# Patient Record
Sex: FEMALE | Race: BLACK OR AFRICAN AMERICAN | NOT HISPANIC OR LATINO | URBAN - NONMETROPOLITAN AREA
[De-identification: names, ages, dates, MRNs, and addresses within clinical notes are randomized per-mention and may not be internally consistent; named-entity substitution may affect disease eponyms.]

---

## 2018-01-16 PROBLEM — H52.4: Noted: 2018-01-16

## 2018-01-16 PROBLEM — H52.13: Noted: 2018-01-16

## 2020-06-03 ENCOUNTER — IMPORTED ENCOUNTER (OUTPATIENT)
Dept: URBAN - NONMETROPOLITAN AREA CLINIC 1 | Facility: CLINIC | Age: 50
End: 2020-06-03

## 2020-06-03 PROCEDURE — S0621 ROUTINE OPHTHALMOLOGICAL EXA: HCPCS

## 2020-06-03 PROCEDURE — 92310 CONTACT LENS FITTING OU: CPT

## 2020-06-03 NOTE — PATIENT DISCUSSION
Myopia/Presbyopia OUDiscussed refractive status with patient. New glasses and contact lens Rx given today. Discussed proper care replacement and hygiene. Continue to monitor.

## 2021-10-20 ENCOUNTER — IMPORTED ENCOUNTER (OUTPATIENT)
Dept: URBAN - NONMETROPOLITAN AREA CLINIC 1 | Facility: CLINIC | Age: 51
End: 2021-10-20

## 2021-10-20 PROCEDURE — 92310 CONTACT LENS FITTING OU: CPT

## 2021-10-20 PROCEDURE — S0621 ROUTINE OPHTHALMOLOGICAL EXA: HCPCS

## 2021-10-20 NOTE — PATIENT DISCUSSION
Myopia/Presbyopia OUDiscussed refractive status with patient. New glasses and contact lens Rx given today. Discussed proper care replacement and hygiene. Continue to monitor.; 's Notes: MR 10/20/2021DFE

## 2022-04-10 ASSESSMENT — VISUAL ACUITY
OD_SC: 20/20-
OS_SC: 20/20
OS_SC: 20/20
OS_SC: 20/20-
OD_SC: 20/20-

## 2022-04-10 ASSESSMENT — TONOMETRY
OS_IOP_MMHG: 15
OD_IOP_MMHG: 13
OD_IOP_MMHG: 15
OS_IOP_MMHG: 13

## 2022-04-10 ASSESSMENT — KERATOMETRY
OS_K2POWER_DIOPTERS: 44.00
OD_AXISANGLE_DEGREES: 173
OS_AXISANGLE_DEGREES: 003
OD_K1POWER_DIOPTERS: 42.25
OD_K2POWER_DIOPTERS: 43.50
OS_K1POWER_DIOPTERS: 43.00